# Patient Record
Sex: FEMALE | Race: WHITE | NOT HISPANIC OR LATINO | URBAN - METROPOLITAN AREA
[De-identification: names, ages, dates, MRNs, and addresses within clinical notes are randomized per-mention and may not be internally consistent; named-entity substitution may affect disease eponyms.]

---

## 2023-02-22 ENCOUNTER — APPOINTMENT (OUTPATIENT)
Dept: URBAN - METROPOLITAN AREA CLINIC 217 | Age: 63
Setting detail: DERMATOLOGY
End: 2023-02-25

## 2023-02-22 DIAGNOSIS — L81.7 PIGMENTED PURPURIC DERMATOSIS: ICD-10-CM

## 2023-02-22 DIAGNOSIS — L50.1 IDIOPATHIC URTICARIA: ICD-10-CM

## 2023-02-22 DIAGNOSIS — L81.4 OTHER MELANIN HYPERPIGMENTATION: ICD-10-CM

## 2023-02-22 PROBLEM — L30.9 DERMATITIS, UNSPECIFIED: Status: ACTIVE | Noted: 2023-02-22

## 2023-02-22 PROCEDURE — OTHER TREATMENT REGIMEN: OTHER

## 2023-02-22 PROCEDURE — 99203 OFFICE O/P NEW LOW 30 MIN: CPT | Mod: 25

## 2023-02-22 PROCEDURE — 11104 PUNCH BX SKIN SINGLE LESION: CPT

## 2023-02-22 PROCEDURE — OTHER SEPARATE AND IDENTIFIABLE DOCUMENTATION: OTHER

## 2023-02-22 PROCEDURE — OTHER BIOPSY BY PUNCH METHOD: OTHER

## 2023-02-22 PROCEDURE — OTHER COUNSELING: OTHER

## 2023-02-22 PROCEDURE — 11105 PUNCH BX SKIN EA SEP/ADDL: CPT

## 2023-02-22 PROCEDURE — OTHER MIPS QUALITY: OTHER

## 2023-02-22 ASSESSMENT — LOCATION SIMPLE DESCRIPTION DERM
LOCATION SIMPLE: LEFT FOREARM
LOCATION SIMPLE: LEFT THIGH
LOCATION SIMPLE: RIGHT FOREARM
LOCATION SIMPLE: LEFT UPPER ARM

## 2023-02-22 ASSESSMENT — LOCATION ZONE DERM
LOCATION ZONE: ARM
LOCATION ZONE: LEG

## 2023-02-22 ASSESSMENT — LOCATION DETAILED DESCRIPTION DERM
LOCATION DETAILED: RIGHT PROXIMAL DORSAL FOREARM
LOCATION DETAILED: LEFT ANTECUBITAL SKIN
LOCATION DETAILED: LEFT PROXIMAL DORSAL FOREARM
LOCATION DETAILED: LEFT ANTERIOR PROXIMAL THIGH

## 2023-02-22 NOTE — HPI: RASH
Is This A New Presentation, Or A Follow-Up?: Rash
Additional History: Patient states the rash is clustered in patches and is purple in color. Patient states the rash will swell up like hives, then flatten out and become purple. Patient has extensive medical history and unsure if the rash is related to any of her conditions. Patient states she has had the rash for years, but it seems to go into remission. Patient states he patches seem to fade in about 4 days, but notes some areas are more persistent than other. Patient states she was prescribed triamcinolone cream to help with the itching sensation, but it has not treated the rash. \\n\\nPatient was screened before evaluation for COVID-19 by inquiring about fever, shortness of breath, weakness, fatigue, loss of taste or smell and gastrointestinal symptoms. Patient denied any of the above.

## 2023-02-22 NOTE — PROCEDURE: TREATMENT REGIMEN
Otc Regimen: Allegra 180mg daily
Plan: Mutually agree to bx at this time to confirm diagnosis. Recommended taking antihistamine in the meantime until results obtained
Detail Level: Zone
Plan: Mutually agree to bx at this time to confirm diagnosis

## 2023-03-08 ENCOUNTER — APPOINTMENT (OUTPATIENT)
Dept: URBAN - METROPOLITAN AREA CLINIC 217 | Age: 63
Setting detail: DERMATOLOGY
End: 2023-03-09

## 2023-03-08 DIAGNOSIS — T88.7XX: ICD-10-CM

## 2023-03-08 PROBLEM — T88.7XXA UNSPECIFIED ADVERSE EFFECT OF DRUG OR MEDICAMENT, INITIAL ENCOUNTER: Status: ACTIVE | Noted: 2023-03-08

## 2023-03-08 PROCEDURE — OTHER TREATMENT REGIMEN: OTHER

## 2023-03-08 PROCEDURE — OTHER COUNSELING: OTHER

## 2023-03-08 PROCEDURE — OTHER DIAGNOSIS COMMENT: OTHER

## 2023-03-08 PROCEDURE — OTHER PATHOLOGY DISCUSSION: OTHER

## 2023-03-08 PROCEDURE — 99213 OFFICE O/P EST LOW 20 MIN: CPT

## 2023-03-08 NOTE — PROCEDURE: DIAGNOSIS COMMENT
Render Risk Assessment In Note?: no
Comment: Suspect secondary to recent start of Victoza per patient’s hx and onset of rash
Detail Level: Simple

## 2023-03-08 NOTE — PROCEDURE: TREATMENT REGIMEN
Plan: Advised pt f/u with prescribing physician for Victoza and consider alternative treatment option. If fails to clear despite discontinuing medication, advised to RTO. She expressed understanding
Detail Level: Zone
Otc Regimen: Allegra 180 mg PO daily PRN

## 2023-04-05 ENCOUNTER — APPOINTMENT (OUTPATIENT)
Dept: URBAN - METROPOLITAN AREA CLINIC 217 | Age: 63
Setting detail: DERMATOLOGY
End: 2023-04-10

## 2023-04-05 DIAGNOSIS — L95.8 OTHER VASCULITIS LIMITED TO THE SKIN: ICD-10-CM

## 2023-04-05 PROCEDURE — OTHER DIAGNOSIS COMMENT: OTHER

## 2023-04-05 PROCEDURE — OTHER PATHOLOGY DISCUSSION: OTHER

## 2023-04-05 PROCEDURE — 99214 OFFICE O/P EST MOD 30 MIN: CPT

## 2023-04-05 PROCEDURE — OTHER ORDER TESTS: OTHER

## 2023-04-05 PROCEDURE — OTHER COUNSELING: OTHER

## 2023-04-05 PROCEDURE — OTHER TREATMENT REGIMEN: OTHER

## 2023-04-05 ASSESSMENT — SEVERITY ASSESSMENT: SEVERITY: MODERATE

## 2023-04-05 NOTE — PROCEDURE: TREATMENT REGIMEN
Detail Level: Zone
Plan: Rash recurrent despite discontinuing Victoza x2 weeks which was initially thought to be the cause. Patient has since resumed medication. Will order blood work to screen for potential underlying systemic cause. May consider treatment with Prednisone or colchicine. Patient has hx of diabetes, therefore may be better to treat with steroid sparing agent.

## 2023-04-05 NOTE — PROCEDURE: DIAGNOSIS COMMENT
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
Render Risk Assessment In Note?: no
Detail Level: Simple

## 2023-04-05 NOTE — PROCEDURE: ORDER TESTS
Bill For Surgical Tray: no
Expected Date Of Service: 04/09/2023
Performing Laboratory: 0
Billing Type: Third-Party Bill

## 2023-04-25 ENCOUNTER — APPOINTMENT (OUTPATIENT)
Dept: URBAN - METROPOLITAN AREA CLINIC 217 | Age: 63
Setting detail: DERMATOLOGY
End: 2023-04-25